# Patient Record
Sex: MALE | Race: WHITE
[De-identification: names, ages, dates, MRNs, and addresses within clinical notes are randomized per-mention and may not be internally consistent; named-entity substitution may affect disease eponyms.]

---

## 2021-05-27 ENCOUNTER — HOSPITAL ENCOUNTER (EMERGENCY)
Dept: HOSPITAL 47 - EC | Age: 26
Discharge: HOME | End: 2021-05-27
Payer: COMMERCIAL

## 2021-05-27 VITALS — RESPIRATION RATE: 18 BRPM | HEART RATE: 94 BPM | TEMPERATURE: 97.9 F

## 2021-05-27 DIAGNOSIS — Z02.83: Primary | ICD-10-CM

## 2021-05-27 PROCEDURE — 99281 EMR DPT VST MAYX REQ PHY/QHP: CPT
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
